# Patient Record
Sex: FEMALE | Race: WHITE | NOT HISPANIC OR LATINO | URBAN - METROPOLITAN AREA
[De-identification: names, ages, dates, MRNs, and addresses within clinical notes are randomized per-mention and may not be internally consistent; named-entity substitution may affect disease eponyms.]

---

## 2023-12-20 ENCOUNTER — TELEPHONE (OUTPATIENT)
Dept: GASTROENTEROLOGY | Facility: CLINIC | Age: 48
End: 2023-12-20

## 2023-12-20 NOTE — TELEPHONE ENCOUNTER
12/20/23  Screened by: Leanna Navas    Referring Provider Self referred    Pre- Screening:     There is no height or weight on file to calculate BMI.  Has patient been referred for a routine screening Colonoscopy? no  Is the patient between 45-75 years old? no      Previous Colonoscopy no   If yes:    Date:     Facility:     Reason:       SCHEDULING STAFF: If the patient is between 45yrs-49yrs, please advise patient to confirm benefits/coverage with their insurance company for a routine screening colonoscopy, some insurance carriers will only cover at 50yrs or older. If the patient is over 75years old, please schedule an office visit.     Does the patient want to see a Gastroenterologist prior to their procedure OR are they having any GI symptoms? yes    Has the patient been hospitalized or had abdominal surgery in the past 6 months?     Does the patient use supplemental oxygen?     Does the patient take Coumadin, Lovenox, Plavix, Elliquis, Xarelto, or other blood thinning medication?     Has the patient had a stroke, cardiac event, or stent placed in the past year?     SCHEDULING STAFF: If patient answers NO to above questions, then schedule procedure. If patient answers YES to above questions, then schedule office appointment.     If patient is between 45yrs - 49yrs, please advise patient that we will have to confirm benefits & coverage with their insurance company for a routine screening colonoscopy.

## 2024-01-02 ENCOUNTER — TELEPHONE (OUTPATIENT)
Age: 49
End: 2024-01-02

## 2024-01-02 ENCOUNTER — OFFICE VISIT (OUTPATIENT)
Age: 49
End: 2024-01-02
Payer: COMMERCIAL

## 2024-01-02 VITALS
WEIGHT: 192 LBS | HEIGHT: 65 IN | SYSTOLIC BLOOD PRESSURE: 150 MMHG | BODY MASS INDEX: 31.99 KG/M2 | DIASTOLIC BLOOD PRESSURE: 90 MMHG

## 2024-01-02 DIAGNOSIS — R13.19 ESOPHAGEAL DYSPHAGIA: ICD-10-CM

## 2024-01-02 DIAGNOSIS — Z12.11 COLON CANCER SCREENING: ICD-10-CM

## 2024-01-02 DIAGNOSIS — K43.9 ABDOMINAL WALL HERNIA: Primary | ICD-10-CM

## 2024-01-02 PROCEDURE — 99204 OFFICE O/P NEW MOD 45 MIN: CPT | Performed by: INTERNAL MEDICINE

## 2024-01-02 NOTE — PROGRESS NOTES
[facility] - Outpatient Consultation  Chanel Gilliam 48 y.o. female MRN: [id number]  Encounter: [id number]          ASSESSMENT AND PLAN:      1. Abdominal wall hernia  Complains of a palpable right abdominal wall hernia.  Easily reducible.  Minimally tender  -Follow for now.  Discussed the signs and symptoms of incarcerated hernia  -Ultimately if it is causing significant symptoms should see surgery about the repair    2. Esophageal dysphagia  Intermittent solid food dysphagia.  - EGD; Future with biopsies and dilation    3. Colon cancer screening  Mother with Crohn's disease but otherwise average risk for colon cancer.  - Colonoscopy; Future    ______________________________________________________________________    HPI: The patient is seen in the office today for evaluation of multiple different GI complaints.  She reports that over the last several months she has been having some intermittent palpable right-sided abdominal pain.  She reports if she is not palpating it does not hurt.  She does report that she started exercising at one point but she tore something in her abdominal wall.  She saw her PCP and abdominal ultrasound was performed that was normal.  She is also complaining of some intermittent dysphagia.  She denies any heartburn or indigestion but she reports about once a month if she eats too quickly food will get hung up in the esophagus.  It most the time goes down but 1 time she felt like she had to regurgitate food to get it out.  She denies any odynophagia, early satiety, or chest pain.  She is moving her bowels regularly.  Her weight is stable.      REVIEW OF SYSTEMS:    CONSTITUTIONAL: Denies any fever, chills, rigors, and weight loss.  HEENT: No earache or tinnitus. Denies hearing loss or visual disturbances.  CARDIOVASCULAR: No chest pain or palpitations.   RESPIRATORY: Denies any cough, hemoptysis, shortness of breath or dyspnea on  "exertion.  GASTROINTESTINAL: As noted in the History of Present Illness.   GENITOURINARY: No problems with urination. Denies any hematuria or dysuria.  NEUROLOGIC: No dizziness or vertigo, denies headaches.   MUSCULOSKELETAL: Denies any muscle or joint pain.   SKIN: Denies skin rashes or itching.   ENDOCRINE: Denies excessive thirst. Denies intolerance to heat or cold.  PSYCHOSOCIAL: Denies depression or anxiety. Denies any recent memory loss.       Historical Information   History reviewed. No pertinent past medical history.  Past Surgical History:   Procedure Laterality Date    BREAST SURGERY      ROTATOR CUFF REPAIR Right     SEPTOPLASTY       Social History   Social History     Substance and Sexual Activity   Alcohol Use Yes    Alcohol/week: 9.0 - 14.0 standard drinks of alcohol    Types: 2 - 4 Glasses of wine, 7 - 10 Cans of beer per week    Comment: Have wine or beer with dinner most nights     Social History     Substance and Sexual Activity   Drug Use Never     Social History     Tobacco Use   Smoking Status Never   Smokeless Tobacco Never     Family History   Problem Relation Age of Onset    Arthritis Mother     Cancer Mother         Glioblastoma    Crohn's disease Mother     Hypertension Mother     Irritable bowel syndrome Mother     Colon polyps Father     Hearing loss Father         Slight hearing loss at 83 years    Early death Maternal Grandfather         Heart Attack in 50s       Meds/Allergies     No current outpatient medications on file.    Allergies   Allergen Reactions    Cat Hair Extract Allergic Rhinitis           Objective     Blood pressure 150/90, height 5' 5\" (1.651 m), weight 87.1 kg (192 lb). Body mass index is 31.95 kg/m².        PHYSICAL EXAM:      General Appearance:   Alert, cooperative, no distress   HEENT:   Normocephalic, atraumatic, anicteric.     Neck:  Supple, symmetrical, trachea midline   Lungs:   Clear to auscultation bilaterally; no rales, rhonchi or wheezing; respirations " unlabored    Heart::   Regular rate and rhythm; no murmur, rub, or gallop.   Abdomen:   Soft, right right abdominal wall defect without prolapsing bowel that is mildly tender, non-distended; normal bowel sounds; no masses, no organomegaly    Genitalia:   Deferred    Rectal:   Deferred    Extremities:  No cyanosis, clubbing or edema    Pulses:  2+ and symmetric    Skin:  No jaundice, rashes, or lesions    Lymph nodes:  No palpable cervical lymphadenopathy        Lab Results:   No visits with results within 1 Day(s) from this visit.   Latest known visit with results is:   No results found for any previous visit.         Radiology Results:     Abdominal ultrasound  11/17/23  Normal  Answers submitted by the patient for this visit:  Abdominal Pain Questionnaire (Submitted on 1/2/2024)  Chief Complaint: Abdominal pain  Chronicity: new  Onset: more than 1 month ago  Onset quality: undetermined  Frequency: daily  Episode duration: 0 Hours  Progression since onset: unchanged  Pain location: RUQ  Pain - numeric: 2/10  Pain quality: dull, tearing  Radiates to: does not radiate  anorexia: No  arthralgias: No  belching: No  constipation: No  diarrhea: No  dysuria: No  fever: No  flatus: No  frequency: No  headaches: No  hematochezia: No  hematuria: No  melena: No  myalgias: No  nausea: No  weight loss: No  vomiting: No  Aggravated by: certain positions, palpation  Relieved by: nothing  Diagnostic workup: ultrasound

## 2024-01-02 NOTE — LETTER
January 2, 2024     Deborah Hooper PA-C  [address]  [place] [address]    Patient: Chanel Gilliam   YOB: 1975   Date of Visit: 1/2/2024       Dear Dr. Hooper:    Thank you for referring Chanel Gilliam to me for evaluation. Below are my notes for this consultation.    If you have questions, please do not hesitate to call me. I look forward to following your patient along with you.         Sincerely,        Juan M Mchugh MD        CC: No Recipients    Juan M Mchugh MD  1/2/2024  4:00 PM  [facility] - Outpatient Consultation  Chanel Gilliam 48 y.o. female MRN: [id number]  Encounter: [id number]          ASSESSMENT AND PLAN:      1. Abdominal wall hernia  Complains of a palpable right abdominal wall hernia.  Easily reducible.  Minimally tender  -Follow for now.  Discussed the signs and symptoms of incarcerated hernia  -Ultimately if it is causing significant symptoms should see surgery about the repair    2. Esophageal dysphagia  Intermittent solid food dysphagia.  - EGD; Future with biopsies and dilation    3. Colon cancer screening  Mother with Crohn's disease but otherwise average risk for colon cancer.  - Colonoscopy; Future    ______________________________________________________________________    HPI: The patient is seen in the office today for evaluation of multiple different GI complaints.  She reports that over the last several months she has been having some intermittent palpable right-sided abdominal pain.  She reports if she is not palpating it does not hurt.  She does report that she started exercising at one point but she tore something in her abdominal wall.  She saw her PCP and abdominal ultrasound was performed that was normal.  She is also complaining of some intermittent dysphagia.  She denies any heartburn or indigestion but she reports about once a month if she eats too quickly food will get hung up in the  esophagus.  It most the time goes down but 1 time she felt like she had to regurgitate food to get it out.  She denies any odynophagia, early satiety, or chest pain.  She is moving her bowels regularly.  Her weight is stable.      REVIEW OF SYSTEMS:    CONSTITUTIONAL: Denies any fever, chills, rigors, and weight loss.  HEENT: No earache or tinnitus. Denies hearing loss or visual disturbances.  CARDIOVASCULAR: No chest pain or palpitations.   RESPIRATORY: Denies any cough, hemoptysis, shortness of breath or dyspnea on exertion.  GASTROINTESTINAL: As noted in the History of Present Illness.   GENITOURINARY: No problems with urination. Denies any hematuria or dysuria.  NEUROLOGIC: No dizziness or vertigo, denies headaches.   MUSCULOSKELETAL: Denies any muscle or joint pain.   SKIN: Denies skin rashes or itching.   ENDOCRINE: Denies excessive thirst. Denies intolerance to heat or cold.  PSYCHOSOCIAL: Denies depression or anxiety. Denies any recent memory loss.       Historical Information  History reviewed. No pertinent past medical history.  Past Surgical History:   Procedure Laterality Date   • BREAST SURGERY     • ROTATOR CUFF REPAIR Right    • SEPTOPLASTY       Social History  Social History     Substance and Sexual Activity   Alcohol Use Yes   • Alcohol/week: 9.0 - 14.0 standard drinks of alcohol   • Types: 2 - 4 Glasses of wine, 7 - 10 Cans of beer per week    Comment: Have wine or beer with dinner most nights     Social History     Substance and Sexual Activity   Drug Use Never     Social History     Tobacco Use   Smoking Status Never   Smokeless Tobacco Never     Family History   Problem Relation Age of Onset   • Arthritis Mother    • Cancer Mother         Glioblastoma   • Crohn's disease Mother    • Hypertension Mother    • Irritable bowel syndrome Mother    • Colon polyps Father    • Hearing loss Father         Slight hearing loss at 83 years   • Early death Maternal Grandfather         Heart Attack in 50s  "      Meds/Allergies    No current outpatient medications on file.    Allergies   Allergen Reactions   • Cat Hair Extract Allergic Rhinitis           Objective    Blood pressure 150/90, height 5' 5\" (1.651 m), weight 87.1 kg (192 lb). Body mass index is 31.95 kg/m².        PHYSICAL EXAM:      General Appearance:   Alert, cooperative, no distress   HEENT:   Normocephalic, atraumatic, anicteric.     Neck:  Supple, symmetrical, trachea midline   Lungs:   Clear to auscultation bilaterally; no rales, rhonchi or wheezing; respirations unlabored    Heart::   Regular rate and rhythm; no murmur, rub, or gallop.   Abdomen:   Soft, right right abdominal wall defect without prolapsing bowel that is mildly tender, non-distended; normal bowel sounds; no masses, no organomegaly    Genitalia:   Deferred    Rectal:   Deferred    Extremities:  No cyanosis, clubbing or edema    Pulses:  2+ and symmetric    Skin:  No jaundice, rashes, or lesions    Lymph nodes:  No palpable cervical lymphadenopathy        Lab Results:   No visits with results within 1 Day(s) from this visit.   Latest known visit with results is:   No results found for any previous visit.         Radiology Results:     Abdominal ultrasound  11/17/23  Normal  Answers submitted by the patient for this visit:  Abdominal Pain Questionnaire (Submitted on 1/2/2024)  Chief Complaint: Abdominal pain  Chronicity: new  Onset: more than 1 month ago  Onset quality: undetermined  Frequency: daily  Episode duration: 0 Hours  Progression since onset: unchanged  Pain location: RUQ  Pain - numeric: 2/10  Pain quality: dull, tearing  Radiates to: does not radiate  anorexia: No  arthralgias: No  belching: No  constipation: No  diarrhea: No  dysuria: No  fever: No  flatus: No  frequency: No  headaches: No  hematochezia: No  hematuria: No  melena: No  myalgias: No  nausea: No  weight loss: No  vomiting: No  Aggravated by: certain positions, palpation  Relieved by: nothing  Diagnostic " workup: ultrasound

## 2024-01-02 NOTE — TELEPHONE ENCOUNTER
Scheduled date of combo (as of today):2/9/24  Physician performing combo:ND  Location of combo:[facility]  Bowel prep reviewed with patient:Miralax  Instructions reviewed with patient by:HOWARD  Clearances: N

## 2024-01-26 ENCOUNTER — ANESTHESIA EVENT (OUTPATIENT)
Dept: ANESTHESIOLOGY | Facility: AMBULATORY SURGERY CENTER | Age: 49
End: 2024-01-26

## 2024-01-26 ENCOUNTER — ANESTHESIA (OUTPATIENT)
Dept: ANESTHESIOLOGY | Facility: AMBULATORY SURGERY CENTER | Age: 49
End: 2024-01-26

## 2024-02-09 ENCOUNTER — ANESTHESIA EVENT (OUTPATIENT)
Dept: GASTROENTEROLOGY | Facility: AMBULATORY SURGERY CENTER | Age: 49
End: 2024-02-09

## 2024-02-09 ENCOUNTER — HOSPITAL ENCOUNTER (OUTPATIENT)
Dept: GASTROENTEROLOGY | Facility: AMBULATORY SURGERY CENTER | Age: 49
Discharge: HOME | End: 2024-02-09
Payer: COMMERCIAL

## 2024-02-09 ENCOUNTER — ANESTHESIA (OUTPATIENT)
Dept: GASTROENTEROLOGY | Facility: AMBULATORY SURGERY CENTER | Age: 49
End: 2024-02-09

## 2024-02-09 VITALS
WEIGHT: 190 LBS | OXYGEN SATURATION: 98 % | HEART RATE: 55 BPM | RESPIRATION RATE: 20 BRPM | TEMPERATURE: 98.9 F | SYSTOLIC BLOOD PRESSURE: 156 MMHG | DIASTOLIC BLOOD PRESSURE: 70 MMHG | BODY MASS INDEX: 31.65 KG/M2 | HEIGHT: 65 IN

## 2024-02-09 DIAGNOSIS — R13.19 ESOPHAGEAL DYSPHAGIA: ICD-10-CM

## 2024-02-09 DIAGNOSIS — Z12.11 COLON CANCER SCREENING: ICD-10-CM

## 2024-02-09 PROBLEM — E66.811 OBESITY (BMI 30.0-34.9): Status: ACTIVE | Noted: 2024-02-09

## 2024-02-09 PROBLEM — E66.9 OBESITY (BMI 30.0-34.9): Status: ACTIVE | Noted: 2024-02-09

## 2024-02-09 LAB
EXT PREGNANCY TEST URINE: NEGATIVE
EXT. CONTROL: NORMAL

## 2024-02-09 PROCEDURE — 43239 EGD BIOPSY SINGLE/MULTIPLE: CPT | Performed by: STUDENT IN AN ORGANIZED HEALTH CARE EDUCATION/TRAINING PROGRAM

## 2024-02-09 PROCEDURE — 88305 TISSUE EXAM BY PATHOLOGIST: CPT | Performed by: PATHOLOGY

## 2024-02-09 PROCEDURE — G0121 COLON CA SCRN NOT HI RSK IND: HCPCS | Performed by: STUDENT IN AN ORGANIZED HEALTH CARE EDUCATION/TRAINING PROGRAM

## 2024-02-09 RX ORDER — SODIUM CHLORIDE, SODIUM LACTATE, POTASSIUM CHLORIDE, CALCIUM CHLORIDE 600; 310; 30; 20 MG/100ML; MG/100ML; MG/100ML; MG/100ML
INJECTION, SOLUTION INTRAVENOUS CONTINUOUS PRN
Status: DISCONTINUED | OUTPATIENT
Start: 2024-02-09 | End: 2024-02-09

## 2024-02-09 RX ORDER — PROPOFOL 10 MG/ML
INJECTION, EMULSION INTRAVENOUS AS NEEDED
Status: DISCONTINUED | OUTPATIENT
Start: 2024-02-09 | End: 2024-02-09

## 2024-02-09 RX ORDER — SODIUM CHLORIDE, SODIUM LACTATE, POTASSIUM CHLORIDE, CALCIUM CHLORIDE 600; 310; 30; 20 MG/100ML; MG/100ML; MG/100ML; MG/100ML
50 INJECTION, SOLUTION INTRAVENOUS CONTINUOUS
Status: DISCONTINUED | OUTPATIENT
Start: 2024-02-09 | End: 2024-02-13 | Stop reason: HOSPADM

## 2024-02-09 RX ADMIN — PROPOFOL 50 MG: 10 INJECTION, EMULSION INTRAVENOUS at 14:18

## 2024-02-09 RX ADMIN — PROPOFOL 50 MG: 10 INJECTION, EMULSION INTRAVENOUS at 14:30

## 2024-02-09 RX ADMIN — PROPOFOL 50 MG: 10 INJECTION, EMULSION INTRAVENOUS at 14:15

## 2024-02-09 RX ADMIN — PROPOFOL 150 MG: 10 INJECTION, EMULSION INTRAVENOUS at 14:14

## 2024-02-09 RX ADMIN — PROPOFOL 50 MG: 10 INJECTION, EMULSION INTRAVENOUS at 14:16

## 2024-02-09 RX ADMIN — PROPOFOL 50 MG: 10 INJECTION, EMULSION INTRAVENOUS at 14:26

## 2024-02-09 RX ADMIN — PROPOFOL 50 MG: 10 INJECTION, EMULSION INTRAVENOUS at 14:21

## 2024-02-09 RX ADMIN — SODIUM CHLORIDE, SODIUM LACTATE, POTASSIUM CHLORIDE, CALCIUM CHLORIDE: 600; 310; 30; 20 INJECTION, SOLUTION INTRAVENOUS at 14:10

## 2024-02-09 RX ADMIN — SODIUM CHLORIDE, SODIUM LACTATE, POTASSIUM CHLORIDE, CALCIUM CHLORIDE 50 ML/HR: 600; 310; 30; 20 INJECTION, SOLUTION INTRAVENOUS at 13:47

## 2024-02-09 RX ADMIN — PROPOFOL 50 MG: 10 INJECTION, EMULSION INTRAVENOUS at 14:17

## 2024-02-09 NOTE — ANESTHESIA POSTPROCEDURE EVALUATION
Post-Op Assessment Note    CV Status:  Stable  Pain Score: 0    Pain management: adequate       Mental Status:  Alert and awake   Hydration Status:  Euvolemic and stable   PONV Controlled:  Controlled   Airway Patency:  Patent and adequate     Post Op Vitals Reviewed: Yes    No anethesia notable event occurred.    Staff: CRNA               BP      Temp      Pulse     Resp      SpO2

## 2024-02-09 NOTE — ANESTHESIA PREPROCEDURE EVALUATION
Procedure:  COLONOSCOPY  EGD    Relevant Problems   GI/HEPATIC   (+) Esophageal dysphagia      Other   (+) Obesity (BMI 30.0-34.9)        Physical Exam    Airway    Mallampati score: II  TM Distance: >3 FB  Neck ROM: full     Dental   No notable dental hx     Cardiovascular      Pulmonary      Other Findings  post-pubertal.      Anesthesia Plan  ASA Score- 2     Anesthesia Type- IV sedation with anesthesia with ASA Monitors.         Additional Monitors:     Airway Plan:            Plan Factors-    Chart reviewed.    Patient summary reviewed.    Patient is not a current smoker.              Induction- intravenous.    Postoperative Plan-     Informed Consent- Anesthetic plan and risks discussed with patient.  I personally reviewed this patient with the CRNA. Discussed and agreed on the Anesthesia Plan with the CRNA..

## 2024-02-09 NOTE — H&P
"History and Physical -  [facility]  Chanel Gilliam 48 y.o. female MRN: [id number]    HPI: Chanel Gilliam is a 48 y.o. female who presents for EGD for GERD/dysphagia and colonoscopy for screening.    REVIEW OF SYSTEMS: Per the HPI, and otherwise unremarkable.    Historical Information   History reviewed. No pertinent past medical history.  Past Surgical History:   Procedure Laterality Date    BREAST SURGERY      ROTATOR CUFF REPAIR Right     SEPTOPLASTY       Social History   Social History     Substance and Sexual Activity   Alcohol Use Yes    Alcohol/week: 9.0 - 14.0 standard drinks of alcohol    Types: 2 - 4 Glasses of wine, 7 - 10 Cans of beer per week    Comment: Have wine or beer with dinner most nights     Social History     Substance and Sexual Activity   Drug Use Never     Social History     Tobacco Use   Smoking Status Never   Smokeless Tobacco Never     Family History   Problem Relation Age of Onset    Arthritis Mother     Cancer Mother         Glioblastoma    Crohn's disease Mother     Hypertension Mother     Irritable bowel syndrome Mother     Colon polyps Father     Hearing loss Father         Slight hearing loss at 83 years    Early death Maternal Grandfather         Heart Attack in 50s       Meds/Allergies     No current outpatient medications on file.    Current Facility-Administered Medications:     lactated ringers infusion, 50 mL/hr, Intravenous, Continuous, 50 mL/hr at 02/09/24 1347    Allergies   Allergen Reactions    Cat Hair Extract Allergic Rhinitis       Objective     /72   Pulse 62   Temp 98.9 °F (37.2 °C) (Temporal)   Resp 20   Ht 5' 5\" (1.651 m)   Wt 86.2 kg (190 lb)   LMP 01/31/2024 (Exact Date)   SpO2 98%   BMI 31.62 kg/m²     PHYSICAL EXAM    Gen: NAD AAOx3  Head: Normocephalic, Atraumatic  CV: S1S2 RRR no m/r/g  CHEST: Clear b/l no c/r/w  ABD: soft, +BS NT/ND  EXT: no edema    ASSESSMENT/PLAN:  This is a 48 y.o. year old female here for " EGD/colonoscopy, and she is stable and optimized for her procedure.

## 2024-02-13 DIAGNOSIS — K20.0 EOSINOPHILIC ESOPHAGITIS: Primary | ICD-10-CM

## 2024-02-13 PROCEDURE — 88305 TISSUE EXAM BY PATHOLOGIST: CPT | Performed by: PATHOLOGY

## 2024-02-13 RX ORDER — OMEPRAZOLE 40 MG/1
40 CAPSULE, DELAYED RELEASE ORAL DAILY
Qty: 30 CAPSULE | Refills: 5 | Status: SHIPPED | OUTPATIENT
Start: 2024-02-13 | End: 2024-08-11

## 2024-02-21 PROBLEM — Z12.11 COLON CANCER SCREENING: Status: RESOLVED | Noted: 2024-01-02 | Resolved: 2024-02-21

## 2024-03-12 ENCOUNTER — OFFICE VISIT (OUTPATIENT)
Dept: OBGYN CLINIC | Facility: CLINIC | Age: 49
End: 2024-03-12
Payer: COMMERCIAL

## 2024-03-12 VITALS
SYSTOLIC BLOOD PRESSURE: 116 MMHG | WEIGHT: 185 LBS | HEIGHT: 65 IN | DIASTOLIC BLOOD PRESSURE: 74 MMHG | BODY MASS INDEX: 30.82 KG/M2

## 2024-03-12 DIAGNOSIS — Z12.31 ENCOUNTER FOR SCREENING MAMMOGRAM FOR MALIGNANT NEOPLASM OF BREAST: ICD-10-CM

## 2024-03-12 DIAGNOSIS — Z12.4 SCREENING FOR MALIGNANT NEOPLASM OF THE CERVIX: ICD-10-CM

## 2024-03-12 DIAGNOSIS — Z01.419 ROUTINE GYNECOLOGICAL EXAMINATION: Primary | ICD-10-CM

## 2024-03-12 PROCEDURE — S0610 ANNUAL GYNECOLOGICAL EXAMINA: HCPCS | Performed by: OBSTETRICS & GYNECOLOGY

## 2024-03-12 NOTE — PROGRESS NOTES
[facility] - [address], [address], [place] [address]    ASSESSMENT/PLAN: Chanel Gilliam is a 48 y.o.  who presents for annual gynecologic exam.    Encounter for routine gynecologic examination  - Routine well woman exam completed today.  - Cervical Cancer Screening: Current ASCCP Guidelines reviewed. Last Pap: Not on file . Next Pap Due: today  - HPV Vaccination status: Not immunized  - Contraceptive counseling discussed.  Current contraception: none  - Breast Cancer Screening: Last Mammogram 2022, ordered  - Colorectal cancer screening was not ordered. She is up to date.  - The following were reviewed in today's visit: breast self exam, mammography screening ordered, and menopause    Additional problems addressed during this visit:  1. Routine gynecological examination    2. Encounter for screening mammogram for malignant neoplasm of breast  -     Mammo screening bilateral w 3d & cad; Future        CC:  Annual Gynecologic Examination    HPI: Chanel Gilliam is a 48 y.o.  who presents for annual gynecologic examination.  HPI    The following portions of the patient's history were reviewed and updated as appropriate: She  has a past medical history of Miscarriage.  She  has a past surgical history that includes Breast surgery; Rotator cuff repair (Right); Septoplasty; and Breast biopsy.  Her family history includes Arthritis in her mother; Asthma in her father; Cancer in her mother; Colon polyps in her father; Crohn's disease in her mother; Early death in her maternal grandfather; Hearing loss in her father; Hypertension in her father and mother; Irritable bowel syndrome in her mother.  She  reports that she has never smoked. She has never used smokeless tobacco. She reports current alcohol use of about 2.0 - 4.0 standard drinks of alcohol per week. She reports that she does not use drugs.  Current Outpatient Medications   Medication Sig Dispense Refill    omeprazole  "(PriLOSEC) 40 MG capsule Take 1 capsule (40 mg total) by mouth daily 30 capsule 5     No current facility-administered medications for this visit.     She is allergic to cat hair extract..    Review of Systems      Objective:  /74 (BP Location: Left arm, Patient Position: Sitting, Cuff Size: Standard)   Ht 5' 5\" (1.651 m)   Wt 83.9 kg (185 lb)   LMP 02/26/2024   BMI 30.79 kg/m²    Physical Exam      PE:  General Appearance: alert and oriented, in no acute distress.   HEENT: PERRL, thyroid without masses or tenderness  Breast: No masses, tenderness, skin changes, nipple D/C or axillary or supraclavicular adenopathy  Abdomen: Soft, non-tender, non-distended, no masses, no rebound or guarding.  Pelvic:       External genitalia: Normal appearance, no abnormal pigmentation, no lesions or masses. Normal Bartholin's and [place]'s.      Urinary system: Urethral meatus normal, bladder non-tender.      Vaginal: normal mucosa without prolapse or lesions. Normal-appearing physiologic discharge      Cervix: Normal-appearing, well-epithelialized, no gross lesions or masses No cervical motion tenderness.      Adnexa: No adnexal masses or tenderness noted.      Uterus: Normal-sized, regular contour, midline, mobile, no uterine tenderness.  Extremities: Normal range of motion.   Skin: normal, no rash or abnormalities  Neurologic: alert, oriented x3  Psychiatric: Appropriate affect, mood stable, cooperative with exam.  "

## 2024-03-18 LAB
CYTOLOGIST CVX/VAG CYTO: NORMAL
DX ICD CODE: NORMAL
HPV GENOTYPE REFLEX: NORMAL
HPV I/H RISK 4 DNA CVX QL PROBE+SIG AMP: NEGATIVE
OTHER STN SPEC: NORMAL
PATH REPORT.FINAL DX SPEC: NORMAL
SL AMB NOTE:: NORMAL
SL AMB SPECIMEN ADEQUACY: NORMAL
SL AMB TEST METHODOLOGY: NORMAL

## 2024-03-19 DIAGNOSIS — Z12.31 ENCOUNTER FOR SCREENING MAMMOGRAM FOR MALIGNANT NEOPLASM OF BREAST: ICD-10-CM

## 2024-07-19 ENCOUNTER — ANESTHESIA EVENT (OUTPATIENT)
Dept: ANESTHESIOLOGY | Facility: AMBULATORY SURGERY CENTER | Age: 49
End: 2024-07-19

## 2024-07-19 ENCOUNTER — PREP FOR PROCEDURE (OUTPATIENT)
Dept: GASTROENTEROLOGY | Facility: CLINIC | Age: 49
End: 2024-07-19

## 2024-07-19 ENCOUNTER — OFFICE VISIT (OUTPATIENT)
Dept: GASTROENTEROLOGY | Facility: CLINIC | Age: 49
End: 2024-07-19
Payer: COMMERCIAL

## 2024-07-19 ENCOUNTER — TELEPHONE (OUTPATIENT)
Dept: GASTROENTEROLOGY | Facility: CLINIC | Age: 49
End: 2024-07-19

## 2024-07-19 ENCOUNTER — ANESTHESIA (OUTPATIENT)
Dept: ANESTHESIOLOGY | Facility: AMBULATORY SURGERY CENTER | Age: 49
End: 2024-07-19

## 2024-07-19 VITALS
DIASTOLIC BLOOD PRESSURE: 90 MMHG | SYSTOLIC BLOOD PRESSURE: 150 MMHG | WEIGHT: 190 LBS | BODY MASS INDEX: 31.65 KG/M2 | HEIGHT: 65 IN

## 2024-07-19 DIAGNOSIS — K20.0 EOSINOPHILIC ESOPHAGITIS: Primary | ICD-10-CM

## 2024-07-19 DIAGNOSIS — K44.9 HIATAL HERNIA: ICD-10-CM

## 2024-07-19 DIAGNOSIS — Z12.11 COLON CANCER SCREENING: ICD-10-CM

## 2024-07-19 PROBLEM — R63.5 ABNORMAL WEIGHT GAIN: Status: ACTIVE | Noted: 2024-07-19

## 2024-07-19 PROBLEM — N60.91 UNSPECIFIED BENIGN MAMMARY DYSPLASIA OF RIGHT BREAST: Status: ACTIVE | Noted: 2024-07-19

## 2024-07-19 PROBLEM — L90.5 SCAR CONDITIONS AND FIBROSIS OF SKIN: Status: ACTIVE | Noted: 2024-07-19

## 2024-07-19 PROBLEM — R01.1 CARDIAC MURMUR, UNSPECIFIED: Status: ACTIVE | Noted: 2024-07-19

## 2024-07-19 PROBLEM — M75.31 CALCIFIC TENDINITIS OF RIGHT SHOULDER: Status: ACTIVE | Noted: 2024-07-19

## 2024-07-19 PROBLEM — Z82.49 FAMILY HISTORY OF ISCHEMIC HEART DISEASE AND OTHER DISEASES OF THE CIRCULATORY SYSTEM: Status: ACTIVE | Noted: 2024-07-19

## 2024-07-19 PROCEDURE — 99214 OFFICE O/P EST MOD 30 MIN: CPT | Performed by: PHYSICIAN ASSISTANT

## 2024-07-19 NOTE — H&P (VIEW-ONLY)
"Formerly [facility] - Outpatient Follow-up Note  Chanel Gilliam 49 y.o. female MRN: [id number]  Encounter: [id number]    ASSESSMENT AND PLAN:    1. Eosinophilic esophagitis  Dx on biopsy on EGD 2/9 but she was PPI-naïve at that time. With hiatal hernia found and higher levels of eosinophils distally in the esophagus vs proximally, suspect this is more reflective of acid reflux than true EOE  Continue omeprazole 40 mg QAM  Recommend diet/lifestyle modifications - handout(s) provided today  Repeat EGD w/ biopsies on PPI  Plan discussed with Dr. Aragon  - EGD; Future    2. Hiatal hernia  Noted on EGD 2/9, plan as above  Encouraged continued weight loss efforts  - EGD; Future    3. Colon cancer screening  Last colonoscopy: 2/9/2024  recall: 10 yr(s)  due: 2/2034      Return for EGD.  ______________________________________________________________________    Chief Complaint   Patient presents with    Follow-up     Pt had procedure in Feb, things are going well       HPI:   Accompanied by self and history is obtained from self.    Patient is a 49 y.o. female with a significant PMH of intermittent solid food dysphagia, abdominal wall hernia presenting for follow up regarding dysphagia/new dx of EOE.    HPI    Intermittent dysphagia  Last seen 1/2 in the office by Dr. Mchugh  EGD 2/9 found EOE on biopsy, started on omeprazole 40 mg QD  Plan to escalate to Dupixent if sx refractory on PPI per Dr. Zavaleta  -----  Feeling the same overall  No difficulty swallowing  Tried 6FE diet -> unable to ID any obvious triggers  Has been on omeprazole most days since April   reports that she coughs a lot at night  She is sleeping elevated on pillows  Typically will have more globus sensation after cold foods  -only ever first thing in AM    Denies heartburn, regurgitation    Workup to date:   EGD: 2/9/2024: \"IMPRESSION:  Medium hiatal hernia  Abnormal mucosa\"  Bx: (+) EOE  Colonoscopy: 2/9/2024, 10 yr " "recall: \"IMPRESSION:  Normal.  Small, protruding hemorrhoids\"  Recent GI imaging: None    Review of Systems   Constitutional:  Negative for appetite change, chills, fever and unexpected weight change.   HENT:  Positive for trouble swallowing (intermittent; globus sensation). Negative for sore throat and voice change.    Respiratory:  Negative for cough and choking.    Cardiovascular:  Negative for chest pain and leg swelling.   Gastrointestinal:  Negative for abdominal distention, abdominal pain, anal bleeding, blood in stool, constipation, diarrhea, nausea, rectal pain and vomiting.   Skin:  Negative for pallor and rash.   Psychiatric/Behavioral:  Negative for confusion and sleep disturbance. The patient is not nervous/anxious.        Historical Information   Past Medical History:   Diagnosis Date    Miscarriage      Past Surgical History:   Procedure Laterality Date    BREAST BIOPSY      BREAST SURGERY      COLONOSCOPY      ROTATOR CUFF REPAIR Right     2022    SEPTOPLASTY      UPPER GASTROINTESTINAL ENDOSCOPY       Social History     Substance and Sexual Activity   Alcohol Use Yes    Alcohol/week: 2.0 - 4.0 standard drinks of alcohol    Types: 2 - 4 Glasses of wine per week    Comment: Have wine or beer with dinner most nights     Social History     Substance and Sexual Activity   Drug Use Never     Social History     Tobacco Use   Smoking Status Never   Smokeless Tobacco Never     Family History   Problem Relation Age of Onset    Arthritis Mother     Cancer Mother         Glioblastoma    Crohn's disease Mother     Hypertension Mother     Irritable bowel syndrome Mother     Colon polyps Father     Hearing loss Father         Slight hearing loss at 83 years    Asthma Father     Hypertension Father     Early death Maternal Grandfather         Heart Attack in 50s         Current Outpatient Medications:     omeprazole (PriLOSEC) 40 MG capsule  Allergies   Allergen Reactions    Cat Hair Extract Allergic Rhinitis " "    Reviewed medications and allergies and updated as indicated    PHYSICAL EXAM:    Blood pressure 150/90, height 5' 5\" (1.651 m), weight 86.2 kg (190 lb). Body mass index is 31.62 kg/m².    Physical Exam  Vitals and nursing note reviewed.   Constitutional:       General: She is not in acute distress.     Appearance: She is not toxic-appearing.   HENT:      Head: Normocephalic.      Mouth/Throat:      Mouth: Mucous membranes are moist.      Pharynx: Oropharynx is clear.   Eyes:      General: No scleral icterus.     Extraocular Movements: Extraocular movements intact.   Neck:      Trachea: Phonation normal.   Cardiovascular:      Comments: Appears well perfused  Pulmonary:      Effort: Pulmonary effort is normal. No respiratory distress.   Musculoskeletal:      Cervical back: Neck supple.      Comments: Moving all 4 extremities spontaneously   Skin:     General: Skin is warm and dry.      Capillary Refill: Capillary refill takes less than 2 seconds.      Coloration: Skin is not jaundiced or pale.   Neurological:      General: No focal deficit present.      Mental Status: She is alert and oriented to person, place, and time.   Psychiatric:         Behavior: Behavior normal. Behavior is cooperative.         Thought Content: Thought content normal.         Lab Results:   No results found.    Radiology Results:   No results found.    I have spent a total time of 25 minutes on 07/19/24 in caring for this patient including Diagnostic results, Prognosis, Risks and benefits of tx options, Instructions for management, Patient and family education, Importance of tx compliance, Risk factor reductions, Impressions, Counseling / Coordination of care, Documenting in the medical record, Reviewing / ordering tests, medicine, procedures  , Obtaining or reviewing history  , and Communicating with other healthcare professionals .    Patient expressed understanding and had all questions and concerns addressed.    Harper Prasad " SAFIA Sim  07/19/24  2:07 PM    This chart was completed in part utilizing [employer] speech voice recognition software. Random word insertions, pronoun errors, and incomplete sentences are an occasional consequence of this system due to software limitations, and ambient noise. Any questions or concerns about the content, text, or information contained within the body of this dictation should be directly addressed to the provider for clarification.

## 2024-07-19 NOTE — TELEPHONE ENCOUNTER
Scheduled date of EGD(as of today): 7/22/24  Physician performing EGD: LUPE  Location of EGD: [facility]  Instructions reviewed with patient by: TARIQ  Clearances: N

## 2024-07-19 NOTE — PROGRESS NOTES
"Formerly [facility] - Outpatient Follow-up Note  Chanel Gilliam 49 y.o. female MRN: [id number]  Encounter: [id number]    ASSESSMENT AND PLAN:    1. Eosinophilic esophagitis  Dx on biopsy on EGD 2/9 but she was PPI-naïve at that time. With hiatal hernia found and higher levels of eosinophils distally in the esophagus vs proximally, suspect this is more reflective of acid reflux than true EOE  Continue omeprazole 40 mg QAM  Recommend diet/lifestyle modifications - handout(s) provided today  Repeat EGD w/ biopsies on PPI  Plan discussed with Dr. Aragon  - EGD; Future    2. Hiatal hernia  Noted on EGD 2/9, plan as above  Encouraged continued weight loss efforts  - EGD; Future    3. Colon cancer screening  Last colonoscopy: 2/9/2024  recall: 10 yr(s)  due: 2/2034      Return for EGD.  ______________________________________________________________________    Chief Complaint   Patient presents with    Follow-up     Pt had procedure in Feb, things are going well       HPI:   Accompanied by self and history is obtained from self.    Patient is a 49 y.o. female with a significant PMH of intermittent solid food dysphagia, abdominal wall hernia presenting for follow up regarding dysphagia/new dx of EOE.    HPI    Intermittent dysphagia  Last seen 1/2 in the office by Dr. Mchugh  EGD 2/9 found EOE on biopsy, started on omeprazole 40 mg QD  Plan to escalate to Dupixent if sx refractory on PPI per Dr. Zavaleta  -----  Feeling the same overall  No difficulty swallowing  Tried 6FE diet -> unable to ID any obvious triggers  Has been on omeprazole most days since April   reports that she coughs a lot at night  She is sleeping elevated on pillows  Typically will have more globus sensation after cold foods  -only ever first thing in AM    Denies heartburn, regurgitation    Workup to date:   EGD: 2/9/2024: \"IMPRESSION:  Medium hiatal hernia  Abnormal mucosa\"  Bx: (+) EOE  Colonoscopy: 2/9/2024, 10 yr " "recall: \"IMPRESSION:  Normal.  Small, protruding hemorrhoids\"  Recent GI imaging: None    Review of Systems   Constitutional:  Negative for appetite change, chills, fever and unexpected weight change.   HENT:  Positive for trouble swallowing (intermittent; globus sensation). Negative for sore throat and voice change.    Respiratory:  Negative for cough and choking.    Cardiovascular:  Negative for chest pain and leg swelling.   Gastrointestinal:  Negative for abdominal distention, abdominal pain, anal bleeding, blood in stool, constipation, diarrhea, nausea, rectal pain and vomiting.   Skin:  Negative for pallor and rash.   Psychiatric/Behavioral:  Negative for confusion and sleep disturbance. The patient is not nervous/anxious.        Historical Information   Past Medical History:   Diagnosis Date    Miscarriage      Past Surgical History:   Procedure Laterality Date    BREAST BIOPSY      BREAST SURGERY      COLONOSCOPY      ROTATOR CUFF REPAIR Right     2022    SEPTOPLASTY      UPPER GASTROINTESTINAL ENDOSCOPY       Social History     Substance and Sexual Activity   Alcohol Use Yes    Alcohol/week: 2.0 - 4.0 standard drinks of alcohol    Types: 2 - 4 Glasses of wine per week    Comment: Have wine or beer with dinner most nights     Social History     Substance and Sexual Activity   Drug Use Never     Social History     Tobacco Use   Smoking Status Never   Smokeless Tobacco Never     Family History   Problem Relation Age of Onset    Arthritis Mother     Cancer Mother         Glioblastoma    Crohn's disease Mother     Hypertension Mother     Irritable bowel syndrome Mother     Colon polyps Father     Hearing loss Father         Slight hearing loss at 83 years    Asthma Father     Hypertension Father     Early death Maternal Grandfather         Heart Attack in 50s         Current Outpatient Medications:     omeprazole (PriLOSEC) 40 MG capsule  Allergies   Allergen Reactions    Cat Hair Extract Allergic Rhinitis " "    Reviewed medications and allergies and updated as indicated    PHYSICAL EXAM:    Blood pressure 150/90, height 5' 5\" (1.651 m), weight 86.2 kg (190 lb). Body mass index is 31.62 kg/m².    Physical Exam  Vitals and nursing note reviewed.   Constitutional:       General: She is not in acute distress.     Appearance: She is not toxic-appearing.   HENT:      Head: Normocephalic.      Mouth/Throat:      Mouth: Mucous membranes are moist.      Pharynx: Oropharynx is clear.   Eyes:      General: No scleral icterus.     Extraocular Movements: Extraocular movements intact.   Neck:      Trachea: Phonation normal.   Cardiovascular:      Comments: Appears well perfused  Pulmonary:      Effort: Pulmonary effort is normal. No respiratory distress.   Musculoskeletal:      Cervical back: Neck supple.      Comments: Moving all 4 extremities spontaneously   Skin:     General: Skin is warm and dry.      Capillary Refill: Capillary refill takes less than 2 seconds.      Coloration: Skin is not jaundiced or pale.   Neurological:      General: No focal deficit present.      Mental Status: She is alert and oriented to person, place, and time.   Psychiatric:         Behavior: Behavior normal. Behavior is cooperative.         Thought Content: Thought content normal.         Lab Results:   No results found.    Radiology Results:   No results found.    I have spent a total time of 25 minutes on 07/19/24 in caring for this patient including Diagnostic results, Prognosis, Risks and benefits of tx options, Instructions for management, Patient and family education, Importance of tx compliance, Risk factor reductions, Impressions, Counseling / Coordination of care, Documenting in the medical record, Reviewing / ordering tests, medicine, procedures  , Obtaining or reviewing history  , and Communicating with other healthcare professionals .    Patient expressed understanding and had all questions and concerns addressed.    Harper Prasad " SAFIA Sim  07/19/24  2:07 PM    This chart was completed in part utilizing [employer] speech voice recognition software. Random word insertions, pronoun errors, and incomplete sentences are an occasional consequence of this system due to software limitations, and ambient noise. Any questions or concerns about the content, text, or information contained within the body of this dictation should be directly addressed to the provider for clarification.

## 2024-07-22 ENCOUNTER — HOSPITAL ENCOUNTER (OUTPATIENT)
Dept: GASTROENTEROLOGY | Facility: AMBULATORY SURGERY CENTER | Age: 49
Discharge: HOME | End: 2024-07-22
Payer: COMMERCIAL

## 2024-07-22 ENCOUNTER — ANESTHESIA EVENT (OUTPATIENT)
Dept: GASTROENTEROLOGY | Facility: AMBULATORY SURGERY CENTER | Age: 49
End: 2024-07-22

## 2024-07-22 ENCOUNTER — ANESTHESIA (OUTPATIENT)
Dept: GASTROENTEROLOGY | Facility: AMBULATORY SURGERY CENTER | Age: 49
End: 2024-07-22

## 2024-07-22 VITALS
RESPIRATION RATE: 21 BRPM | DIASTOLIC BLOOD PRESSURE: 53 MMHG | SYSTOLIC BLOOD PRESSURE: 109 MMHG | HEART RATE: 69 BPM | TEMPERATURE: 97.7 F | HEIGHT: 65 IN | BODY MASS INDEX: 31.32 KG/M2 | WEIGHT: 188 LBS | OXYGEN SATURATION: 99 %

## 2024-07-22 DIAGNOSIS — K20.0 EOSINOPHILIC ESOPHAGITIS: ICD-10-CM

## 2024-07-22 DIAGNOSIS — K44.9 HIATAL HERNIA: ICD-10-CM

## 2024-07-22 LAB
EXT PREGNANCY TEST URINE: NEGATIVE
EXT. CONTROL: NORMAL

## 2024-07-22 PROCEDURE — 88305 TISSUE EXAM BY PATHOLOGIST: CPT | Performed by: PATHOLOGY

## 2024-07-22 PROCEDURE — 43249 ESOPH EGD DILATION <30 MM: CPT | Performed by: INTERNAL MEDICINE

## 2024-07-22 PROCEDURE — 43239 EGD BIOPSY SINGLE/MULTIPLE: CPT | Performed by: INTERNAL MEDICINE

## 2024-07-22 RX ORDER — PROPOFOL 10 MG/ML
INJECTION, EMULSION INTRAVENOUS AS NEEDED
Status: DISCONTINUED | OUTPATIENT
Start: 2024-07-22 | End: 2024-07-22

## 2024-07-22 RX ORDER — SODIUM CHLORIDE, SODIUM LACTATE, POTASSIUM CHLORIDE, CALCIUM CHLORIDE 600; 310; 30; 20 MG/100ML; MG/100ML; MG/100ML; MG/100ML
50 INJECTION, SOLUTION INTRAVENOUS CONTINUOUS
Status: DISCONTINUED | OUTPATIENT
Start: 2024-07-22 | End: 2024-07-26 | Stop reason: HOSPADM

## 2024-07-22 RX ADMIN — PROPOFOL 150 MG: 10 INJECTION, EMULSION INTRAVENOUS at 13:06

## 2024-07-22 RX ADMIN — SODIUM CHLORIDE, SODIUM LACTATE, POTASSIUM CHLORIDE, CALCIUM CHLORIDE 50 ML/HR: 600; 310; 30; 20 INJECTION, SOLUTION INTRAVENOUS at 12:50

## 2024-07-22 RX ADMIN — PROPOFOL 50 MG: 10 INJECTION, EMULSION INTRAVENOUS at 13:08

## 2024-07-22 RX ADMIN — PROPOFOL 20 MG: 10 INJECTION, EMULSION INTRAVENOUS at 13:10

## 2024-07-22 NOTE — ANESTHESIA PREPROCEDURE EVALUATION
Procedure:  EGD    Relevant Problems   ANESTHESIA (within normal limits)      GI/HEPATIC   (+) Hiatal hernia      MUSCULOSKELETAL   (+) Hiatal hernia      PULMONARY (within normal limits)   (-) Sleep apnea   (-) Smoking   (-) URI (upper respiratory infection)      FEN/Gastrointestinal   (+) Eosinophilic esophagitis      Physical Exam    Airway    Mallampati score: II  TM Distance: >3 FB  Neck ROM: full     Dental   No notable dental hx     Cardiovascular      Pulmonary      Other Findings  post-pubertal.      Anesthesia Plan  ASA Score- 1     Anesthesia Type- IV sedation with anesthesia with ASA Monitors.         Additional Monitors:     Airway Plan:            Plan Factors-Exercise tolerance (METS): >4 METS.    Chart reviewed.   Existing labs reviewed. Patient summary reviewed.    Patient is not a current smoker.              Induction- intravenous.    Postoperative Plan-         Informed Consent- Anesthetic plan and risks discussed with patient.  I personally reviewed this patient with the CRNA. Discussed and agreed on the Anesthesia Plan with the CRNA..

## 2024-07-22 NOTE — INTERVAL H&P NOTE
H&P reviewed. After examining the patient I find no changes in the patients condition since the H&P had been written.    Vitals:    07/22/24 1250   BP: 142/69   Pulse: 61   Resp: 18   Temp: 97.7 °F (36.5 °C)   SpO2: 97%

## 2024-07-24 PROCEDURE — 88305 TISSUE EXAM BY PATHOLOGIST: CPT | Performed by: PATHOLOGY

## 2024-11-21 DIAGNOSIS — K20.0 EOSINOPHILIC ESOPHAGITIS: ICD-10-CM

## 2024-11-21 RX ORDER — OMEPRAZOLE 40 MG/1
40 CAPSULE, DELAYED RELEASE ORAL DAILY
Qty: 30 CAPSULE | Refills: 5 | Status: SHIPPED | OUTPATIENT
Start: 2024-11-21 | End: 2025-05-20

## 2024-12-18 DIAGNOSIS — K20.0 EOSINOPHILIC ESOPHAGITIS: ICD-10-CM

## 2024-12-18 RX ORDER — OMEPRAZOLE 40 MG/1
40 CAPSULE, DELAYED RELEASE ORAL DAILY
Qty: 90 CAPSULE | Refills: 1 | Status: SHIPPED | OUTPATIENT
Start: 2024-12-18 | End: 2025-06-16

## 2025-03-18 ENCOUNTER — ANNUAL EXAM (OUTPATIENT)
Dept: OBGYN CLINIC | Facility: CLINIC | Age: 50
End: 2025-03-18
Payer: COMMERCIAL

## 2025-03-18 VITALS
DIASTOLIC BLOOD PRESSURE: 70 MMHG | SYSTOLIC BLOOD PRESSURE: 112 MMHG | BODY MASS INDEX: 32.49 KG/M2 | HEIGHT: 65 IN | WEIGHT: 195 LBS

## 2025-03-18 DIAGNOSIS — Z01.419 ROUTINE GYNECOLOGICAL EXAMINATION: Primary | ICD-10-CM

## 2025-03-18 DIAGNOSIS — Z12.31 ENCOUNTER FOR SCREENING MAMMOGRAM FOR MALIGNANT NEOPLASM OF BREAST: ICD-10-CM

## 2025-03-18 PROCEDURE — S0612 ANNUAL GYNECOLOGICAL EXAMINA: HCPCS | Performed by: OBSTETRICS & GYNECOLOGY

## 2025-03-18 NOTE — PROGRESS NOTES
[facility] - [address], [address], [place] [address]    ASSESSMENT/PLAN: Chanel Gilliam is a 49 y.o.  who presents for annual gynecologic exam.    Encounter for routine gynecologic examination  - Routine well woman exam completed today.  - Cervical Cancer Screening: Current ASCCP Guidelines reviewed. Last Pap: 2024 . Next Pap Due:   - HPV Vaccination status: Not immunized  - Contraceptive counseling discussed.  Current contraception: none  - Breast Cancer Screening: Last Mammogram 03/15/2024, ordered  - Colorectal cancer screening was not ordered. She is up to date.  - The following were reviewed in today's visit: breast self exam, mammography screening ordered, and menopause    Additional problems addressed during this visit:  1. Routine gynecological examination  2. Encounter for screening mammogram for malignant neoplasm of breast  -     Mammo screening bilateral w 3d and cad; Future      CC:  Annual Gynecologic Examination    HPI: Chanel Gilliam is a 49 y.o.  who presents for annual gynecologic examination.  HPI    The following portions of the patient's history were reviewed and updated as appropriate: She  has a past medical history of GERD (gastroesophageal reflux disease) and Miscarriage.  She  has a past surgical history that includes Breast surgery; Rotator cuff repair (Right); Septoplasty; Breast biopsy; Colonoscopy; and Upper gastrointestinal endoscopy.  Her family history includes Arthritis in her mother; Asthma in her father; Cancer in her mother; Colon polyps in her father; Crohn's disease in her mother; Early death in her maternal grandfather; Hearing loss in her father; Hypertension in her father and mother; Irritable bowel syndrome in her mother.  She  reports that she has never smoked. She has never used smokeless tobacco. She reports current alcohol use of about 2.0 - 4.0 standard drinks of alcohol per week. She reports that she does not use  "drugs.  Current Outpatient Medications   Medication Sig Dispense Refill    omeprazole (PriLOSEC) 40 MG capsule TAKE 1 CAPSULE (40 MG TOTAL) BY MOUTH DAILY. 90 capsule 1     No current facility-administered medications for this visit.     She is allergic to cat dander..    Review of Systems      Objective:  /70 (BP Location: Right arm, Patient Position: Sitting, Cuff Size: Standard)   Ht 5' 5\" (1.651 m)   Wt 88.5 kg (195 lb)   LMP 03/14/2025   BMI 32.45 kg/m²    Physical Exam      PE:  General Appearance: alert and oriented, in no acute distress.   HEENT: PERRL, thyroid without masses or tenderness  Breast: No masses, tenderness, skin changes, nipple D/C or axillary or supraclavicular adenopathy  Abdomen: Soft, non-tender, non-distended, no masses, no rebound or guarding.  Pelvic:       External genitalia: Normal appearance, no abnormal pigmentation, no lesions or masses. Normal Bartholin's and [place]'s.      Urinary system: Urethral meatus normal, bladder non-tender.      Vaginal: normal mucosa without prolapse or lesions. Normal-appearing physiologic discharge      Cervix: Normal-appearing, well-epithelialized, no gross lesions or masses No cervical motion tenderness.      Adnexa: No adnexal masses or tenderness noted.      Uterus: Normal-sized, regular contour, midline, mobile, no uterine tenderness.  Extremities: Normal range of motion.   Skin: normal, no rash or abnormalities  Neurologic: alert, oriented x3  Psychiatric: Appropriate affect, mood stable, cooperative with exam.  "

## 2025-03-24 DIAGNOSIS — Z12.31 ENCOUNTER FOR SCREENING MAMMOGRAM FOR MALIGNANT NEOPLASM OF BREAST: ICD-10-CM
